# Patient Record
Sex: FEMALE | Race: WHITE | NOT HISPANIC OR LATINO
[De-identification: names, ages, dates, MRNs, and addresses within clinical notes are randomized per-mention and may not be internally consistent; named-entity substitution may affect disease eponyms.]

---

## 2022-09-13 PROBLEM — Z00.00 ENCOUNTER FOR PREVENTIVE HEALTH EXAMINATION: Status: ACTIVE | Noted: 2022-09-13

## 2022-10-03 ENCOUNTER — APPOINTMENT (OUTPATIENT)
Dept: ORTHOPEDIC SURGERY | Facility: CLINIC | Age: 47
End: 2022-10-03

## 2022-10-28 ENCOUNTER — APPOINTMENT (OUTPATIENT)
Dept: ORTHOPEDIC SURGERY | Facility: CLINIC | Age: 47
End: 2022-10-28

## 2022-10-28 VITALS
HEART RATE: 81 BPM | SYSTOLIC BLOOD PRESSURE: 148 MMHG | BODY MASS INDEX: 38.57 KG/M2 | WEIGHT: 240 LBS | HEIGHT: 66 IN | DIASTOLIC BLOOD PRESSURE: 90 MMHG

## 2022-10-28 DIAGNOSIS — Z78.9 OTHER SPECIFIED HEALTH STATUS: ICD-10-CM

## 2022-10-28 DIAGNOSIS — Z85.3 PERSONAL HISTORY OF MALIGNANT NEOPLASM OF BREAST: ICD-10-CM

## 2022-10-28 DIAGNOSIS — Z86.39 PERSONAL HISTORY OF OTHER ENDOCRINE, NUTRITIONAL AND METABOLIC DISEASE: ICD-10-CM

## 2022-10-28 PROCEDURE — 20610 DRAIN/INJ JOINT/BURSA W/O US: CPT | Mod: LT

## 2022-10-28 PROCEDURE — 99203 OFFICE O/P NEW LOW 30 MIN: CPT | Mod: 25

## 2022-10-28 PROCEDURE — 73562 X-RAY EXAM OF KNEE 3: CPT | Mod: LT

## 2022-10-28 RX ORDER — MELOXICAM 15 MG/1
15 TABLET ORAL
Qty: 21 | Refills: 0 | Status: ACTIVE | COMMUNITY
Start: 2022-10-28 | End: 1900-01-01

## 2022-10-28 NOTE — PHYSICAL EXAM
[de-identified] : General:\par Awake, alert, no acute distress, Patient was cooperative and appropriate during the examination.\par \par The patient is of normal weight for height and age.\par \par Walks without an antalgic gait.\par \par Full, painless range of motion of the neck and back.\par \par Exam of the bilateral lower extremities is intact and symmetric with regards to dermatologic, vascular, and neurologic exam. Bilateral lower extremity sensation is grossly intact to light touch in the DP/SP/T/S/S nerve distributions. Intact DF/PF/EHL. BIlateral lower extremities warm and well-perfused with brisk capillary refill.\par \par \par Pulmonary:\par Regular, nonlabored breathing\par \par Abdomen:\par Soft, nontender, nondistended.\par \par Lymphatic:\par No evidence of axillary lymphadenopathy\par \par Left knee Examination:\par Physical examination of the knee demonstrates normal skin without signs of skin changes or abnormalities. No erythema, warmth, or joint effusion is appreciated.  There is mild to moderate swelling noted.\par  \par Sensation is intact to light touch L2-S1\par Palpable DP/PT pulse\par EHL/FHL/TA/GSC motor function intact\par  \par Range of Motion\par 0-100 degrees with pain at terminal degrees of flexion\par \par Strength Testing\par Quadriceps 5-/5\par Hamstring 5/5\par Patient is able to perform a straight leg raise without difficulty.\par  \par Palpation\par Not tender to palpation about the distal femur, proximal tibia, or patella\par No palpable defect appreciated in the quadriceps or patellar tendons\par Exquisitely mildly tender to palpation of medial joint line\par Mildly tender to palpation of lateral joint line\par  \par Special Tests\par Anterior Drawer negative\par Posterior Drawer negative\par Lachman Exam negative\par No Varus or Valgus Laxity at 0 or 30 degrees of knee flexion\par Jr's Test negative for pain or crepitus\par Active compression of the patella negative for pain or crepitus\par Translation of the patella 2 quadrants with a firm endpoint [de-identified] : X-rays 3 views of the right knee taken the office today on 10/20/2022 showed moderate joint space narrowing on the medial side indicative of arthritis with no other acute fractures or dislocations noted.

## 2022-10-28 NOTE — DISCUSSION/SUMMARY
[de-identified] : Assessment: Patient is a 47-year-old female with right knee osteoarthritis.\par \par Plan: I had a long discussion with the patient today regarding the nature of their diagnosis and treatment plan. We discussed the risks and benefits of no treatment as well as nonoperative and operative treatments.  I reviewed the x-rays with the patient today that revealed mild to moderate osteoarthritic changes with no other acute findings.  At this time I am recommending conservative treatment including ice, heat, rest, Mobic 15 mg once daily with food for pain and inflammation, physical therapy, weight loss for symptomatic relief.  GI precautions were discussed and prescriptions were provided today.  I am also recommending a cortisone injection of the right knee be administered in the office today.  She tolerated the procedure well with no adverse effects.  She will follow-up in 6 to 8 weeks for repeat evaluation if her symptoms persist despite conservative treatment we may recommend an MRI at that time pending her symptoms.  Patient seen and examined with Dr. Kennedy today.\par  \par The patient verbalizes their understanding and agrees with the plan.  All questions were answered to their satisfaction.

## 2022-10-28 NOTE — PROCEDURE
[de-identified] : I injected the patient's right knee today with cortisone.\par  \par I discussed at length with the patient the planned steroid and lidocaine injection. The risks, benefits, convalescence and alternatives were reviewed. The possible side effects discussed included but were not limited to: pain, swelling, heat, bleeding, and redness. Symptoms are generally mild but if they are extensive then contact the office. Giving pain relievers by mouth such as NSAIDs or Tylenol can generally treat the reactions to steroid and lidocaine. Rare cases of infection have been noted. Rash, hives and itching may occur post injection. If you have muscle pain or cramps, flushing and or swelling of the face, rapid heart beat, nausea, dizziness, fever, chills, headache, difficulty breathing, swelling in the arms or legs, or have a prickly feeling of your skin, contact a health care provider immediately. Following this discussion, the knee was prepped with Chlorhexidine and Alcohol and under sterile conditions the 80 mg Depo-Medrol and 4 cc Lidocaine injection was performed with a 22 gauge needle through a anterolateral injection site. The needle was introduced into the joint, aspiration was performed to ensure intra-articular placement and the medication was injected. Upon withdrawal of the needle the site was cleaned with alcohol and a band aid applied. The patient tolerated the injection well and there were no adverse effects. Post injection instructions included no strenuous activity for 24 hours, cryotherapy and if there are any adverse effects to contact the office.

## 2022-10-28 NOTE — HISTORY OF PRESENT ILLNESS
[de-identified] : 10/28/2022 : NAA PIERSON  is a 47 year  old female who presents to the office for evaluation of her left knee.  She states for over a year she has had pain in her left knee that is mostly on the medial and lateral aspect of the knee and radiates to the back of her knee and is worse with movement and activity and alleviated with rest.  She states she has taken naproxen given to her by her primary care which did not give any relief.  She states there was no acute traumatic injury but the pain is just getting worse over the last several months.  She is here for specialist opinion today because of her knee.  She denies any numbness or tingling distally.  She has no other complaints today.

## 2022-10-28 NOTE — REASON FOR VISIT
[Initial Visit] : an initial visit for [[employer] ] : provided by [employer]   [FreeTextEntry2] : Left knee pain  [Interpreters_IDNumber] : [id number] [Interpreters_FullName] : Gracia [TWNoteComboBox1] : [place]

## 2022-11-15 ENCOUNTER — RX RENEWAL (OUTPATIENT)
Age: 47
End: 2022-11-15

## 2023-01-20 ENCOUNTER — APPOINTMENT (OUTPATIENT)
Dept: ORTHOPEDIC SURGERY | Facility: CLINIC | Age: 48
End: 2023-01-20
Payer: MEDICAID

## 2023-01-20 VITALS
BODY MASS INDEX: 38.57 KG/M2 | SYSTOLIC BLOOD PRESSURE: 135 MMHG | HEIGHT: 66 IN | HEART RATE: 64 BPM | DIASTOLIC BLOOD PRESSURE: 86 MMHG | WEIGHT: 240 LBS

## 2023-01-20 DIAGNOSIS — M17.12 UNILATERAL PRIMARY OSTEOARTHRITIS, LEFT KNEE: ICD-10-CM

## 2023-01-20 PROCEDURE — 99213 OFFICE O/P EST LOW 20 MIN: CPT

## 2023-01-20 NOTE — DISCUSSION/SUMMARY
[de-identified] : Assessment: Patient is a 48-year-old female with left knee osteoarthritis, possible internal derangement\par \par Plan: I had a long discussion with the patient today regarding the nature of their diagnosis and treatment plan. We discussed the risks and benefits of no treatment as well as nonoperative and operative treatments.  I reviewed the x-rays with the patient today that revealed mild to moderate osteoarthritic changes with no other acute findings.  At this time I am recommending continued conservative treatment including ice, heat, rest, over-the-counter anti-inflammatories for pain and inflammation.  Due to the lack of improvement with conservative treatment including physical therapy, anti-inflammatories, a cortisone injection over the last several months I am recommending an MRI for further evaluation.  She will follow-up after the MRI is complete.  We talked about potential gel injections versus arthroscopy pending the MRI results.  Patient discussed and reviewed with Dr. Kennedy today.\par \par The patient verbalizes their understanding and agrees with the plan.  All questions were answered to their satisfaction.

## 2023-01-20 NOTE — PHYSICAL EXAM
[de-identified] : General:\par Awake, alert, no acute distress, Patient was cooperative and appropriate during the examination.\par \par The patient is of normal weight for height and age.\par \par Walks without an antalgic gait.\par \par Full, painless range of motion of the neck and back.\par \par Exam of the bilateral lower extremities is intact and symmetric with regards to dermatologic, vascular, and neurologic exam. Bilateral lower extremity sensation is grossly intact to light touch in the DP/SP/T/S/S nerve distributions. Intact DF/PF/EHL. BIlateral lower extremities warm and well-perfused with brisk capillary refill.\par \par \par Pulmonary:\par Regular, nonlabored breathing\par \par Abdomen:\par Soft, nontender, nondistended.\par \par Lymphatic:\par No evidence of axillary lymphadenopathy\par \par Left knee Examination:\par Physical examination of the knee demonstrates normal skin without signs of skin changes or abnormalities. No erythema, warmth, or joint effusion is appreciated.  There is mild to moderate swelling noted.\par  \par Sensation is intact to light touch L2-S1\par Palpable DP/PT pulse\par EHL/FHL/TA/GSC motor function intact\par  \par Range of Motion\par 0- 120 degrees with pain at terminal degrees of flexion\par \par Strength Testing\par Quadriceps 5-/5\par Hamstring 5/5\par Patient is able to perform a straight leg raise without difficulty.\par  \par Palpation\par Not tender to palpation about the distal femur, proximal tibia, or patella\par No palpable defect appreciated in the quadriceps or patellar tendons\par Moderately mildly tender to palpation of medial joint line\par Mildly tender to palpation of lateral joint line\par  \par Special Tests\par Anterior Drawer negative\par Posterior Drawer negative\par Lachman Exam negative\par No Varus or Valgus Laxity at 0 or 30 degrees of knee flexion\par Jr's Test negative for pain or crepitus\par Active compression of the patella negative for pain or crepitus\par Translation of the patella 2 quadrants with a firm endpoint [de-identified] : X-rays 3 views of the right knee taken the office today on 10/20/2022 showed moderate joint space narrowing on the medial side indicative of arthritis with no other acute fractures or dislocations noted.

## 2023-01-20 NOTE — HISTORY OF PRESENT ILLNESS
[de-identified] : 01/20/2023 : NAA PIERSON  is a 48 year  old female who presents to the office for follow-up evaluation of her left knee.  She states that since last office visit she has not had any relief in her symptoms.  She states she still has pain on the inside and the outside part of the knee mostly on the inside that is worse with activities and movement and alleviated with rest.  She took the medication and did a little bit of therapy and had the injection that did not give any significant relief.  She states the injection did not provide any relief for her.  She is here for routine follow-up.  She denies any numbness or tingling distally.  She has no other complaints today.\par \par 10/28/2022 : NAA PIERSON  is a 47 year  old female who presents to the office for evaluation of her left knee.  She states for over a year she has had pain in her left knee that is mostly on the medial and lateral aspect of the knee and radiates to the back of her knee and is worse with movement and activity and alleviated with rest.  She states she has taken naproxen given to her by her primary care which did not give any relief.  She states there was no acute traumatic injury but the pain is just getting worse over the last several months.  She is here for specialist opinion today because of her knee.  She denies any numbness or tingling distally.  She has no other complaints today.

## 2023-01-20 NOTE — REASON FOR VISIT
[[employer] ] : provided by [employer]   [Initial Visit] : an initial visit for [FreeTextEntry2] : Left knee pain  [Interpreters_IDNumber] : [id number] [Interpreters_FullName] : Gracia [TWNoteComboBox1] : [place]

## 2023-02-10 ENCOUNTER — APPOINTMENT (OUTPATIENT)
Dept: MRI IMAGING | Facility: CLINIC | Age: 48
End: 2023-02-10